# Patient Record
Sex: FEMALE | Race: WHITE | NOT HISPANIC OR LATINO | URBAN - METROPOLITAN AREA
[De-identification: names, ages, dates, MRNs, and addresses within clinical notes are randomized per-mention and may not be internally consistent; named-entity substitution may affect disease eponyms.]

---

## 2023-03-20 PROBLEM — F19.951: Status: ACTIVE | Noted: 2023-03-20

## 2023-03-20 PROBLEM — R44.1 VISUAL HALLUCINATION: Status: ACTIVE | Noted: 2023-03-20

## 2023-03-20 PROBLEM — G20.A1 PARKINSON'S DISEASE (MULTI): Status: ACTIVE | Noted: 2023-03-20

## 2023-03-20 PROBLEM — R05.8 COUGH WITH EXPECTORATION: Status: ACTIVE | Noted: 2023-03-20

## 2023-03-20 PROBLEM — H52.13 BILATERAL MYOPIA: Status: ACTIVE | Noted: 2023-03-20

## 2023-03-20 PROBLEM — N39.41 URGE INCONTINENCE: Status: ACTIVE | Noted: 2023-03-20

## 2023-03-20 PROBLEM — N81.89 PELVIC FLOOR WEAKNESS: Status: ACTIVE | Noted: 2023-03-20

## 2023-03-20 PROBLEM — R41.3 MEMORY DIFFICULTY: Status: ACTIVE | Noted: 2023-03-20

## 2023-03-20 PROBLEM — R32 URINARY INCONTINENCE: Status: ACTIVE | Noted: 2023-03-20

## 2023-03-20 PROBLEM — H52.223 REGULAR ASTIGMATISM OF BOTH EYES WITH PRESBYOPIA: Status: ACTIVE | Noted: 2023-03-20

## 2023-03-20 PROBLEM — M81.0 OSTEOPOROSIS: Status: ACTIVE | Noted: 2023-03-20

## 2023-03-20 PROBLEM — N90.89 VULVAR IRRITATION: Status: ACTIVE | Noted: 2023-03-20

## 2023-03-20 PROBLEM — H52.03 HYPEROPIA OF BOTH EYES WITH REGULAR ASTIGMATISM: Status: ACTIVE | Noted: 2023-03-20

## 2023-03-20 PROBLEM — I82.409 DEEP VEIN THROMBOSIS (MULTI): Status: ACTIVE | Noted: 2023-03-20

## 2023-03-20 PROBLEM — G20.A1 DEMENTIA DUE TO PARKINSON'S DISEASE WITHOUT BEHAVIORAL DISTURBANCE (MULTI): Status: ACTIVE | Noted: 2023-03-20

## 2023-03-20 PROBLEM — E55.9 VITAMIN D INSUFFICIENCY: Status: ACTIVE | Noted: 2023-03-20

## 2023-03-20 PROBLEM — H04.123 DRY EYE SYNDROME, BILATERAL: Status: ACTIVE | Noted: 2023-03-20

## 2023-03-20 PROBLEM — F02.80 DEMENTIA DUE TO PARKINSON'S DISEASE WITHOUT BEHAVIORAL DISTURBANCE (MULTI): Status: ACTIVE | Noted: 2023-03-20

## 2023-03-20 PROBLEM — H53.8 BLURRY VISION, BILATERAL: Status: ACTIVE | Noted: 2023-03-20

## 2023-03-20 PROBLEM — N39.0 ACUTE LOWER UTI: Status: ACTIVE | Noted: 2023-03-20

## 2023-03-20 PROBLEM — N81.11 CYSTOCELE, MIDLINE: Status: ACTIVE | Noted: 2023-03-20

## 2023-03-20 PROBLEM — H35.359 CME (CYSTOID MACULAR EDEMA): Status: ACTIVE | Noted: 2023-03-20

## 2023-03-20 PROBLEM — Z96.0 VAGINAL PESSARY PRESENT: Status: ACTIVE | Noted: 2023-03-20

## 2023-03-20 PROBLEM — H52.4 REGULAR ASTIGMATISM OF BOTH EYES WITH PRESBYOPIA: Status: ACTIVE | Noted: 2023-03-20

## 2023-03-20 PROBLEM — N76.0 VAGINITIS: Status: ACTIVE | Noted: 2023-03-20

## 2023-03-20 PROBLEM — H43.813 PVD (POSTERIOR VITREOUS DETACHMENT), BOTH EYES: Status: ACTIVE | Noted: 2023-03-20

## 2023-03-20 PROBLEM — H26.40 SECONDARY CATARACT: Status: ACTIVE | Noted: 2023-03-20

## 2023-03-20 PROBLEM — S32.599A: Status: ACTIVE | Noted: 2023-03-20

## 2023-03-20 PROBLEM — N95.2 VAGINAL ATROPHY: Status: ACTIVE | Noted: 2023-03-20

## 2023-03-20 PROBLEM — R10.9 ABDOMINAL PAIN: Status: ACTIVE | Noted: 2023-03-20

## 2023-03-20 PROBLEM — K58.9 IRRITABLE BOWEL SYNDROME: Status: ACTIVE | Noted: 2023-03-20

## 2023-03-20 PROBLEM — N39.0 CHRONIC UTI: Status: ACTIVE | Noted: 2023-03-20

## 2023-03-20 PROBLEM — N31.9 NEUROGENIC BLADDER: Status: ACTIVE | Noted: 2023-03-20

## 2023-03-20 PROBLEM — H35.341 FULL THICKNESS MACULAR HOLE OF RIGHT EYE: Status: ACTIVE | Noted: 2023-03-20

## 2023-03-20 PROBLEM — Z97.3 WEARS GLASSES: Status: ACTIVE | Noted: 2023-03-20

## 2023-03-20 PROBLEM — H40.1131 PRIMARY OPEN ANGLE GLAUCOMA (POAG) OF BOTH EYES, MILD STAGE: Status: ACTIVE | Noted: 2023-03-20

## 2023-03-20 PROBLEM — N89.8 LEUKORRHEA, VAGINAL, NONINFECTIOUS: Status: ACTIVE | Noted: 2023-03-20

## 2023-03-20 PROBLEM — E78.5 HYPERLIPIDEMIA: Status: ACTIVE | Noted: 2023-03-20

## 2023-03-20 PROBLEM — H26.9 CATARACT: Status: ACTIVE | Noted: 2023-03-20

## 2023-03-20 PROBLEM — R31.9 HEMATURIA: Status: ACTIVE | Noted: 2023-03-20

## 2023-03-20 PROBLEM — K63.5 BENIGN COLON POLYP: Status: ACTIVE | Noted: 2023-03-20

## 2023-03-20 PROBLEM — K25.3 ACUTE GASTRIC ULCER WITHOUT HEMORRHAGE OR PERFORATION: Status: ACTIVE | Noted: 2023-03-20

## 2023-03-20 PROBLEM — K86.2 PANCREATIC CYST (HHS-HCC): Status: ACTIVE | Noted: 2023-03-20

## 2023-03-20 PROBLEM — H52.223 HYPEROPIA OF BOTH EYES WITH REGULAR ASTIGMATISM: Status: ACTIVE | Noted: 2023-03-20

## 2023-03-20 PROBLEM — Z96.1 PSEUDOPHAKIA OF BOTH EYES: Status: ACTIVE | Noted: 2023-03-20

## 2023-03-20 PROBLEM — R33.9 INCOMPLETE BLADDER EMPTYING: Status: ACTIVE | Noted: 2023-03-20

## 2023-03-20 PROBLEM — K59.00 CONSTIPATION: Status: ACTIVE | Noted: 2023-03-20

## 2023-05-22 ENCOUNTER — NURSING HOME VISIT (OUTPATIENT)
Dept: POST ACUTE CARE | Facility: EXTERNAL LOCATION | Age: 78
End: 2023-05-22
Payer: MEDICARE

## 2023-05-22 DIAGNOSIS — G20.A1 DEMENTIA DUE TO PARKINSON'S DISEASE WITHOUT BEHAVIORAL DISTURBANCE (MULTI): ICD-10-CM

## 2023-05-22 DIAGNOSIS — R10.84 GENERALIZED ABDOMINAL PAIN: ICD-10-CM

## 2023-05-22 DIAGNOSIS — G20.A1 PARKINSON'S DISEASE (MULTI): Primary | ICD-10-CM

## 2023-05-22 DIAGNOSIS — F02.80 DEMENTIA DUE TO PARKINSON'S DISEASE WITHOUT BEHAVIORAL DISTURBANCE (MULTI): ICD-10-CM

## 2023-05-22 PROCEDURE — 99348 HOME/RES VST EST LOW MDM 30: CPT | Performed by: REGISTERED NURSE

## 2023-05-22 NOTE — LETTER
Patient: Michaela Mendiola  : 1945    Encounter Date: 2023    PROGRESS NOTE    Subjective  Chief complaint: Michaela Mendiola is a 78 y.o. female who is a assisted living/ home patient patient being seen and evaluated for monthly general medical care and follow-up.    HPI:  HPI patient denies pain shortness of breath fever chills stable at this time continue hospice care  Objective  Physical Exam  Constitutional:       General: She is not in acute distress.  Eyes:      Extraocular Movements: Extraocular movements intact.   Cardiovascular:      Rate and Rhythm: Normal rate and regular rhythm.   Pulmonary:      Effort: Pulmonary effort is normal.      Breath sounds: Normal breath sounds.   Abdominal:      General: Bowel sounds are normal.      Palpations: Abdomen is soft.   Musculoskeletal:      Cervical back: Neck supple.      Right lower leg: No edema.      Left lower leg: No edema.   Neurological:      Mental Status: She is alert.   Psychiatric:         Mood and Affect: Mood normal.         Behavior: Behavior is cooperative.         Assessment/Plan  Problem List Items Addressed This Visit          Nervous    Abdominal pain     Stable at this time monitor for pain         Dementia due to Parkinson's disease without behavioral disturbance (CMS/[facility])     Continue hospice mentation at baseline         Parkinson's disease (CMS/HCC) - Primary     Stable continue to monitor continue hospice          Medications, treatments, and labs reviewed  Continue medications and treatments as listed in [facility]    Scribe Attestation  IElizabeth Scribe   attest that this documentation has been prepared under the direction and in the presence of RENEE Holly[facility]    Provider Attestation - Scribe documentation  All medical record entries made by the Scribe were at my direction and personally dictated by me. I have reviewed the chart and agree that the record accurately reflects my personal performance of the  history, physical exam, discussion and plan.   BRANDY Holly        Electronically Signed By: BRANDY Holly   6/15/23  9:38 AM

## 2023-05-23 NOTE — PROGRESS NOTES
PROGRESS NOTE    Subjective   Chief complaint: Michaela Mendiola is a 78 y.o. female who is a assisted living/ home patient patient being seen and evaluated for monthly general medical care and follow-up.    HPI:  HPI patient denies pain shortness of breath fever chills stable at this time continue hospice care  Objective   Physical Exam  Constitutional:       General: She is not in acute distress.  Eyes:      Extraocular Movements: Extraocular movements intact.   Cardiovascular:      Rate and Rhythm: Normal rate and regular rhythm.   Pulmonary:      Effort: Pulmonary effort is normal.      Breath sounds: Normal breath sounds.   Abdominal:      General: Bowel sounds are normal.      Palpations: Abdomen is soft.   Musculoskeletal:      Cervical back: Neck supple.      Right lower leg: No edema.      Left lower leg: No edema.   Neurological:      Mental Status: She is alert.   Psychiatric:         Mood and Affect: Mood normal.         Behavior: Behavior is cooperative.         Assessment/Plan   Problem List Items Addressed This Visit          Nervous    Abdominal pain     Stable at this time monitor for pain         Dementia due to Parkinson's disease without behavioral disturbance (CMS/[facility])     Continue hospice mentation at baseline         Parkinson's disease (CMS/[facility]) - Primary     Stable continue to monitor continue hospice          Medications, treatments, and labs reviewed  Continue medications and treatments as listed in [facility]    Scribe Attestation  Elizabeth COLIN Scribe   attest that this documentation has been prepared under the direction and in the presence of RENEE Holly-[facility]    Provider Attestation - Scribe documentation  All medical record entries made by the Scribe were at my direction and personally dictated by me. I have reviewed the chart and agree that the record accurately reflects my personal performance of the history, physical exam, discussion and plan.   Dheeraj Leonard  APRN-CNP

## 2023-06-14 ENCOUNTER — NURSING HOME VISIT (OUTPATIENT)
Dept: POST ACUTE CARE | Facility: EXTERNAL LOCATION | Age: 78
End: 2023-06-14
Payer: MEDICARE

## 2023-06-14 DIAGNOSIS — H40.1131 PRIMARY OPEN ANGLE GLAUCOMA (POAG) OF BOTH EYES, MILD STAGE: ICD-10-CM

## 2023-06-14 DIAGNOSIS — K25.3 ACUTE GASTRIC ULCER WITHOUT HEMORRHAGE OR PERFORATION: Primary | ICD-10-CM

## 2023-06-14 DIAGNOSIS — F02.80 DEMENTIA DUE TO PARKINSON'S DISEASE WITHOUT BEHAVIORAL DISTURBANCE (MULTI): ICD-10-CM

## 2023-06-14 DIAGNOSIS — G20.A1 PARKINSON'S DISEASE (MULTI): ICD-10-CM

## 2023-06-14 DIAGNOSIS — G20.A1 DEMENTIA DUE TO PARKINSON'S DISEASE WITHOUT BEHAVIORAL DISTURBANCE (MULTI): ICD-10-CM

## 2023-06-14 PROCEDURE — 99349 HOME/RES VST EST MOD MDM 40: CPT

## 2023-06-14 NOTE — LETTER
Patient: Michaela Mendiola  : 1945    Encounter Date: 2023    PROGRESS NOTE    Subjective  Chief complaint: Michaela Mendiola is a 78 y.o. female who is an assisted living facility patient being seen and evaluated for monthly general medical care and follow-up    HPI:  Patient seen and evaluated for general medical care and monthly follow-up. Resides in [facility] for assistance with ADLs and medical needs. Patient is W/C bound due to Parkinsons dse, and has coexisting dx of dementia w/ FTT and hypoxia. Patient was recently changed to hospice w/ [place]. Patient alert, confused to time, following directions well. Continuous O2 changed to PRN. Patient denies discomfort, chest pain, sob, cough, n/v/f/c. No concerns per nursing staff.       Objective  Vital signs: 122/77-97.9-82-16    Physical Exam  Constitutional:       Comments: Thin appearing   HENT:      Head: Normocephalic.      Nose: Nose normal.      Mouth/Throat:      Mouth: Mucous membranes are moist.   Eyes:      Extraocular Movements: Extraocular movements intact.   Cardiovascular:      Rate and Rhythm: Normal rate and regular rhythm.      Pulses: Normal pulses.      Heart sounds: Normal heart sounds.   Pulmonary:      Effort: Pulmonary effort is normal.      Breath sounds: Normal breath sounds.   Abdominal:      General: Abdomen is flat. Bowel sounds are normal.      Palpations: Abdomen is soft.   Musculoskeletal:      Cervical back: Normal range of motion.      Comments: Limited BLE mov't  Jerky mov'ts in BUE   Skin:     General: Skin is warm and dry.      Capillary Refill: Capillary refill takes 2 to 3 seconds.   Neurological:      Mental Status: She is alert. Mental status is at baseline. She is disoriented.   Psychiatric:         Mood and Affect: Mood normal.         Behavior: Behavior normal.         Assessment/Plan  Problem List Items Addressed This Visit       Acute gastric ulcer without hemorrhage or perforation - Primary      Stable  famotidine         Dementia due to Parkinson's disease without behavioral disturbance (CMS/HCC)    Parkinson's disease (CMS/HCC)     Changed to hospice of [place] due to progression of disease  Continue to monitor  Carbidopa-levodopa           Primary open angle glaucoma (POAG) of both eyes, mild stage     Stable  Monitor  A. Tears, prednisolone, latanoprost          Medications, treatments, and labs reviewed  Continue medications and treatments as listed in EMR    BRANDY Miguel    Electronically Signed By: BRANDY Miguel   6/15/23  1:45 PM

## 2023-06-15 NOTE — PROGRESS NOTES
PROGRESS NOTE    Subjective   Chief complaint: Michaela Mendiola is a 78 y.o. female who is an assisted living facility patient being seen and evaluated for monthly general medical care and follow-up    HPI:  Patient seen and evaluated for general medical care and monthly follow-up. Resides in [facility] for assistance with ADLs and medical needs. Patient is W/C bound due to Parkinsons dse, and has coexisting dx of dementia w/ FTT and hypoxia. Patient was recently changed to hospice w/ [place]. Patient alert, confused to time, following directions well. Continuous O2 changed to PRN. Patient denies discomfort, chest pain, sob, cough, n/v/f/c. No concerns per nursing staff.       Objective   Vital signs: 122/77-97.9-82-16    Physical Exam  Constitutional:       Comments: Thin appearing   HENT:      Head: Normocephalic.      Nose: Nose normal.      Mouth/Throat:      Mouth: Mucous membranes are moist.   Eyes:      Extraocular Movements: Extraocular movements intact.   Cardiovascular:      Rate and Rhythm: Normal rate and regular rhythm.      Pulses: Normal pulses.      Heart sounds: Normal heart sounds.   Pulmonary:      Effort: Pulmonary effort is normal.      Breath sounds: Normal breath sounds.   Abdominal:      General: Abdomen is flat. Bowel sounds are normal.      Palpations: Abdomen is soft.   Musculoskeletal:      Cervical back: Normal range of motion.      Comments: Limited BLE mov't  Jerky mov'ts in BUE   Skin:     General: Skin is warm and dry.      Capillary Refill: Capillary refill takes 2 to 3 seconds.   Neurological:      Mental Status: She is alert. Mental status is at baseline. She is disoriented.   Psychiatric:         Mood and Affect: Mood normal.         Behavior: Behavior normal.         Assessment/Plan   Problem List Items Addressed This Visit       Acute gastric ulcer without hemorrhage or perforation - Primary     Stable  famotidine         Dementia due to Parkinson's disease without behavioral  disturbance (CMS/[facility])    Parkinson's disease (CMS/[facility])     Changed to hosp[facility] of [facility] due to progression of disease  Continue to monitor  Carbidopa-levodopa           Primary open angle glaucoma (POAG) of both eyes, mild stage     Stable  Monitor  A. Tears, prednisolone, latanoprost          Medications, treatments, and labs reviewed  Continue medications and treatments as listed in EMR    Natasha Forman, APRN-CNP

## 2023-06-15 NOTE — ASSESSMENT & PLAN NOTE
Changed to hosp[facility] of [place] due to progression of disease  Continue to monitor  Carbidopa-levodopa

## 2023-06-18 DIAGNOSIS — G20.A1 PARKINSON'S DISEASE (MULTI): Primary | ICD-10-CM

## 2023-06-20 RX ORDER — LATANOPROST 50 UG/ML
SOLUTION/ DROPS OPHTHALMIC
Qty: 2.5 ML | Refills: 3 | Status: SHIPPED | OUTPATIENT
Start: 2023-06-20

## 2023-07-03 ENCOUNTER — NURSING HOME VISIT (OUTPATIENT)
Dept: POST ACUTE CARE | Facility: EXTERNAL LOCATION | Age: 78
End: 2023-07-03
Payer: MEDICARE

## 2023-07-03 DIAGNOSIS — F02.80 DEMENTIA DUE TO PARKINSON'S DISEASE WITHOUT BEHAVIORAL DISTURBANCE (MULTI): Primary | ICD-10-CM

## 2023-07-03 DIAGNOSIS — G20.A1 DEMENTIA DUE TO PARKINSON'S DISEASE WITHOUT BEHAVIORAL DISTURBANCE (MULTI): Primary | ICD-10-CM

## 2023-07-03 DIAGNOSIS — H04.123 DRY EYE SYNDROME, BILATERAL: ICD-10-CM

## 2023-07-03 DIAGNOSIS — K59.00 CONSTIPATION, UNSPECIFIED CONSTIPATION TYPE: ICD-10-CM

## 2023-07-03 PROCEDURE — 99349 HOME/RES VST EST MOD MDM 40: CPT

## 2023-07-03 NOTE — LETTER
Patient: Michaela Mendiola  : 1945    Encounter Date: 2023    PROGRESS NOTE    Subjective  Chief complaint: Michaela Mendiola is a 78 y.o. female who is an assisted living facility patient being seen and evaluated for general medical care and monthly follow-up    HPI:  Patient is at baseline mentation. Quiet, cooperative. Denies pain, f/c/n/v/d cough sob. RA at time. WC for mobility. No concerns per nursing staff.         Objective  Vital signs: 92/65-97-14-97%    Physical Exam  HENT:      Head: Normocephalic.      Mouth/Throat:      Mouth: Mucous membranes are dry.   Eyes:      Extraocular Movements: Extraocular movements intact.      Conjunctiva/sclera: Conjunctivae normal.   Cardiovascular:      Rate and Rhythm: Normal rate and regular rhythm.      Heart sounds: Normal heart sounds.   Pulmonary:      Effort: Pulmonary effort is normal.      Breath sounds: Normal breath sounds.   Abdominal:      General: Abdomen is flat. Bowel sounds are normal.      Palpations: Abdomen is soft.   Musculoskeletal:         General: Normal range of motion.      Cervical back: Normal range of motion.      Comments: Limited ROM  Generalized weakness   Skin:     General: Skin is warm and dry.      Capillary Refill: Capillary refill takes less than 2 seconds.   Neurological:      Mental Status: She is alert. Mental status is at baseline. She is disoriented.   Psychiatric:         Behavior: Behavior normal.         Assessment/Plan  Problem List Items Addressed This Visit       Constipation     Stable  Bisacodyl supp and MOM PRN  Monitor BMs         Dementia due to Parkinson's disease without behavioral disturbance (CMS/HCC) - Primary     Baseline mentation  No agitation or aggressive behaviors  Monitor  Sinemet seroquel   Ativan PRN         Dry eye syndrome, bilateral     Stable  Artif tears  monitor          Medications, treatments, and labs reviewed  Continue medications and treatments as listed in EMR    Natasha Forman,  APRN-CNP      Electronically Signed By: BRANDY Miguel   7/7/23  7:52 PM

## 2023-07-07 NOTE — PROGRESS NOTES
PROGRESS NOTE    Subjective   Chief complaint: Michaela Mendiola is a 78 y.o. female who is an assisted living facility patient being seen and evaluated for general medical care and monthly follow-up    HPI:  Patient is at baseline mentation. Quiet, cooperative. Denies pain, f/c/n/v/d cough sob. RA at time. WC for mobility. No concerns per nursing staff.         Objective   Vital signs: 92/65-97-14-97%    Physical Exam  HENT:      Head: Normocephalic.      Mouth/Throat:      Mouth: Mucous membranes are dry.   Eyes:      Extraocular Movements: Extraocular movements intact.      Conjunctiva/sclera: Conjunctivae normal.   Cardiovascular:      Rate and Rhythm: Normal rate and regular rhythm.      Heart sounds: Normal heart sounds.   Pulmonary:      Effort: Pulmonary effort is normal.      Breath sounds: Normal breath sounds.   Abdominal:      General: Abdomen is flat. Bowel sounds are normal.      Palpations: Abdomen is soft.   Musculoskeletal:         General: Normal range of motion.      Cervical back: Normal range of motion.      Comments: Limited ROM  Generalized weakness   Skin:     General: Skin is warm and dry.      Capillary Refill: Capillary refill takes less than 2 seconds.   Neurological:      Mental Status: She is alert. Mental status is at baseline. She is disoriented.   Psychiatric:         Behavior: Behavior normal.         Assessment/Plan   Problem List Items Addressed This Visit       Constipation     Stable  Bisacodyl supp and MOM PRN  Monitor BMs         Dementia due to Parkinson's disease without behavioral disturbance (CMS/HCC) - Primary     Baseline mentation  No agitation or aggressive behaviors  Monitor  Sinemet seroquel   Ativan PRN         Dry eye syndrome, bilateral     Stable  Artif tears  monitor          Medications, treatments, and labs reviewed  Continue medications and treatments as listed in EMR    Natasha Forman, APRN-CNP

## 2023-07-24 DIAGNOSIS — K52.9 ACUTE GASTROENTERITIS: Primary | ICD-10-CM

## 2023-07-28 RX ORDER — FAMOTIDINE 20 MG/1
20 TABLET, FILM COATED ORAL DAILY
Qty: 90 TABLET | Refills: 2 | Status: SHIPPED | OUTPATIENT
Start: 2023-07-28